# Patient Record
Sex: FEMALE | URBAN - METROPOLITAN AREA
[De-identification: names, ages, dates, MRNs, and addresses within clinical notes are randomized per-mention and may not be internally consistent; named-entity substitution may affect disease eponyms.]

---

## 2019-04-12 LAB
EXTERNAL HIV CONFIRMATION: NORMAL
EXTERNAL HIV SCREEN: NORMAL
HCV AB SER-ACNC: NORMAL

## 2023-01-13 ENCOUNTER — TELEPHONE (OUTPATIENT)
Dept: ADMINISTRATIVE | Facility: OTHER | Age: 33
End: 2023-01-13

## 2023-01-13 NOTE — TELEPHONE ENCOUNTER
----- Message from Altagracia Watkins sent at 1/13/2023 10:20 AM EST -----  Regarding: Care Gap Update  01/13/23 10:20 AM    Hello, our patient attached above has had Hepatitis C completed/performed  Please assist in updating the patient chart by pulling the Care Everywhere (CE) document  The date of service is 04/12/19  Hello, our patient attached above has had HIV completed/performed  Please assist in updating the patient chart by pulling the Care Everywhere (CE) document  The date of service is 04/12/19         Thank you,  Altagracia Watkins   [facility]

## 2023-01-13 NOTE — TELEPHONE ENCOUNTER
----- Message from Gagandeep Cueva sent at 1/13/2023 10:21 AM EST -----  Regarding: Care Gap Update  01/13/23 10:21 AM    Hello, our patient attached above has had Pap Smear (HPV) aka Cervical Cancer Screening completed/performed  Please assist in updating the patient chart by pulling the Care Everywhere (CE) document  The date of service is 05/29/20       Thank you,  Gagandeep Cueva   [facility]

## 2023-01-13 NOTE — TELEPHONE ENCOUNTER
Upon review of the In Basket request we were able to locate, review, and update the patient chart as requested for Hepatitis C , HIV and Pap Smear (HPV) aka Cervical Cancer Screening  Any additional questions or concerns should be emailed to the Practice Liaisons via the appropriate education email address, please do not reply via In Basket      Thank you  Yoselyn Juanite

## 2023-01-17 ENCOUNTER — APPOINTMENT (OUTPATIENT)
Dept: LAB | Facility: CLINIC | Age: 33
End: 2023-01-17

## 2023-01-17 ENCOUNTER — OFFICE VISIT (OUTPATIENT)
Dept: FAMILY MEDICINE CLINIC | Facility: CLINIC | Age: 33
End: 2023-01-17

## 2023-01-17 VITALS
DIASTOLIC BLOOD PRESSURE: 62 MMHG | HEIGHT: 66 IN | BODY MASS INDEX: 26.42 KG/M2 | OXYGEN SATURATION: 99 % | HEART RATE: 73 BPM | WEIGHT: 164.4 LBS | TEMPERATURE: 98.2 F | SYSTOLIC BLOOD PRESSURE: 119 MMHG

## 2023-01-17 DIAGNOSIS — Z13.220 ENCOUNTER FOR SCREENING FOR LIPID DISORDER: ICD-10-CM

## 2023-01-17 DIAGNOSIS — Z86.39 HISTORY OF HYPOTHYROIDISM: Primary | ICD-10-CM

## 2023-01-17 DIAGNOSIS — Z86.39 HISTORY OF HYPOTHYROIDISM: ICD-10-CM

## 2023-01-17 PROBLEM — F51.01 PRIMARY INSOMNIA: Status: ACTIVE | Noted: 2023-01-17

## 2023-01-17 LAB
CHOLEST SERPL-MCNC: 205 MG/DL
HDLC SERPL-MCNC: 107 MG/DL
LDLC SERPL CALC-MCNC: 88 MG/DL (ref 0–100)
NONHDLC SERPL-MCNC: 98 MG/DL
TRIGL SERPL-MCNC: 50 MG/DL
TSH SERPL DL<=0.05 MIU/L-ACNC: 1.75 UIU/ML (ref 0.45–4.5)

## 2023-01-17 RX ORDER — NORGESTIMATE AND ETHINYL ESTRADIOL 0.25-0.035
KIT ORAL
COMMUNITY
Start: 2023-01-05

## 2023-01-17 NOTE — ASSESSMENT & PLAN NOTE
We discussed strategies for maintaining sleep and recommended she not use her smart phone when she wakes up at night  She should also try using a meditation km  Were trying to avoid sleep aids

## 2023-01-17 NOTE — PROGRESS NOTES
Assessment/Plan:    Problem List Items Addressed This Visit    None  Visit Diagnoses     History of hypothyroidism    -  Primary    Relevant Orders    TSH, 3rd generation with Free T4 reflex    Encounter for screening for lipid disorder        Relevant Orders    Lipid panel        BMI Counseling: Body mass index is 26 53 kg/m²  The BMI is above normal  Nutrition recommendations include encouraging healthy choices of fruits and vegetables  Rationale for BMI follow-up plan is due to patient being overweight or obese  Depression Screening and Follow-up Plan: Patient was screened for depression during today's encounter  They screened negative with a PHQ-2 score of 0  She gets to the eye doctor annually, sees her dentist regularly  Her Pap smear is up-to-date  Chief Complaint    Physical Exam; Insomnia         Patient ID: Maryana Patel is a 28 y o  female who returns for a preventive visit  She has a long-term issue with waking up during sleep  She tends to wake-up after 2-3 hours  She normally goes to bed around 10:00 PM and gets up around 6:00 AM  She has two small children  Objective:    /62 (BP Location: Left arm, Patient Position: Sitting, Cuff Size: Large)   Pulse 73   Temp 98 2 °F (36 8 °C)   Ht 5' 6" (1 676 m)   Wt 74 6 kg (164 lb 6 4 oz)   SpO2 99%   BMI 26 53 kg/m²     Wt Readings from Last 3 Encounters:   01/17/23 74 6 kg (164 lb 6 4 oz)         Physical Exam  Vitals reviewed  Constitutional:       General: She is not in acute distress  Appearance: Normal appearance  She is well-developed  She is not ill-appearing  HENT:      Head: Normocephalic and atraumatic  Right Ear: Tympanic membrane and external ear normal       Left Ear: Tympanic membrane and external ear normal       Nose: Nose normal       Mouth/Throat:      Mouth: Mucous membranes are moist       Pharynx: Oropharynx is clear  Eyes:      General: No scleral icterus       Extraocular Movements: Extraocular movements intact  Pupils: Pupils are equal, round, and reactive to light  Neck:      Thyroid: No thyroid mass or thyromegaly  Vascular: No JVD  Cardiovascular:      Rate and Rhythm: Normal rate and regular rhythm  Heart sounds: Normal heart sounds  No murmur heard  No friction rub  No gallop  Pulmonary:      Breath sounds: Normal breath sounds  Abdominal:      General: Bowel sounds are normal  There is no distension  Palpations: Abdomen is soft  There is no mass  Musculoskeletal:         General: No swelling  Skin:     Comments: No evidence of recurrence of basal cell cancer on her left forehead  She has scars on her back from previous excisions  She has multiple nevi which appear benign  Neurological:      Mental Status: She is alert  Comments: Cranial nerves II through XII intact, motor was 5/5 in all major groups     Psychiatric:         Mood and Affect: Mood normal

## 2023-03-11 ENCOUNTER — OFFICE VISIT (OUTPATIENT)
Dept: URGENT CARE | Facility: CLINIC | Age: 33
End: 2023-03-11

## 2023-03-11 VITALS
SYSTOLIC BLOOD PRESSURE: 123 MMHG | WEIGHT: 160 LBS | HEIGHT: 66 IN | RESPIRATION RATE: 18 BRPM | HEART RATE: 94 BPM | BODY MASS INDEX: 25.71 KG/M2 | OXYGEN SATURATION: 98 % | TEMPERATURE: 99.1 F | DIASTOLIC BLOOD PRESSURE: 90 MMHG

## 2023-03-11 DIAGNOSIS — J02.0 STREP PHARYNGITIS: Primary | ICD-10-CM

## 2023-03-11 LAB — S PYO AG THROAT QL: POSITIVE

## 2023-03-11 RX ORDER — AMOXICILLIN 500 MG/1
500 TABLET, FILM COATED ORAL 2 TIMES DAILY
Qty: 14 TABLET | Refills: 0 | Status: SHIPPED | OUTPATIENT
Start: 2023-03-11 | End: 2023-03-18

## 2023-03-11 NOTE — PROGRESS NOTES
[facility]        NAME: Frank Amado is a 35 y o  female  : 1990    MRN: [id number]  DATE: 2023  TIME: 10:33 AM    Assessment and Plan   Strep pharyngitis [J02 0]  1  Strep pharyngitis  POCT rapid strepA    amoxicillin (AMOXIL) 500 MG tablet        Discussed problem with patient  Rapid strep performed today was positive so prescribing amoxicillin for coverage  Vies conservative management by starting with warm salt water gargles as well as ice pops  Should be taking Tylenol and ibuprofen for pain symptoms  Should push fluids and follow-up with PCP if symptoms do not improve or report to the ER if symptoms worsen  Patient Instructions       Follow up with PCP in 3-5 days  Proceed to  ER if symptoms worsen  Chief Complaint     Chief Complaint   Patient presents with   • Sore Throat     Sore throat, trouble swallowing, body aches x3 days  History of Present Illness       Sore Throat   This is a new problem  The current episode started in the past 7 days  The problem has been unchanged  There has been no fever  The pain is at a severity of 5/10  The pain is moderate  Associated symptoms include swollen glands and trouble swallowing  Pertinent negatives include no abdominal pain, congestion, coughing, diarrhea, ear pain, headaches, shortness of breath, stridor or vomiting  She has had no exposure to strep or mono  She has tried NSAIDs for the symptoms  The treatment provided mild relief  Review of Systems   Review of Systems   Constitutional: Positive for appetite change and fever  Negative for chills and fatigue  HENT: Positive for sore throat and trouble swallowing  Negative for congestion, ear pain, postnasal drip, rhinorrhea, sinus pressure and sinus pain  Respiratory: Negative for cough, shortness of breath, wheezing and stridor  Cardiovascular: Negative for chest pain and palpitations     Gastrointestinal: Negative for abdominal pain, constipation, diarrhea, nausea and vomiting  Musculoskeletal: Positive for myalgias  Neurological: Negative for dizziness, syncope, light-headedness and headaches  Current Medications       Current Outpatient Medications:   •  amoxicillin (AMOXIL) 500 MG tablet, Take 1 tablet (500 mg total) by mouth 2 (two) times a day for 7 days, Disp: 14 tablet, Rfl: 0  •  norgestimate-ethinyl estradiol (ORTHO-CYCLEN) 0 25-35 MG-MCG per tablet, , Disp: , Rfl:     Current Allergies     Allergies as of 03/11/2023   • (No Known Allergies)            The following portions of the patient's history were reviewed and updated as appropriate: allergies, current medications, past family history, past medical history, past social history, past surgical history and problem list      Past Medical History:   Diagnosis Date   • Abnormal Pap smear of cervix    • Basal cell carcinoma, face    • 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    • Hypothyroid in pregnancy, antepartum        Past Surgical History:   Procedure Laterality Date   • KNEE ARTHROSCOPY W/ MENISCAL REPAIR     • WISDOM TOOTH EXTRACTION         Family History   Problem Relation Age of Onset   • Diabetic kidney disease Mother    • Parkinsonism Father    • Thyroid disease Maternal Grandmother    • Diabetes Maternal Grandmother          Medications have been verified  Objective   /90   Pulse 94   Temp 99 1 °F (37 3 °C)   Resp 18   Ht 5' 6" (1 676 m)   Wt 72 6 kg (160 lb)   SpO2 98%   BMI 25 82 kg/m²        Physical Exam     Physical Exam  Vitals and nursing note reviewed  Constitutional:       General: She is not in acute distress  Appearance: She is well-developed and normal weight  She is not ill-appearing, toxic-appearing or diaphoretic  HENT:      Head: Normocephalic  Right Ear: Tympanic membrane and ear canal normal  No tenderness  No middle ear effusion  Tympanic membrane is not erythematous        Left Ear: Tympanic membrane and ear canal normal  No tenderness  No middle ear effusion  Tympanic membrane is not erythematous  Nose: No congestion or rhinorrhea  Mouth/Throat:      Mouth: Mucous membranes are moist  No oral lesions  Pharynx: Oropharynx is clear  Uvula midline  Posterior oropharyngeal erythema present  No pharyngeal swelling, oropharyngeal exudate or uvula swelling  Tonsils: No tonsillar exudate or tonsillar abscesses  0 on the right  0 on the left  Eyes:      Extraocular Movements:      Right eye: Normal extraocular motion  Left eye: Normal extraocular motion  Conjunctiva/sclera: Conjunctivae normal       Pupils: Pupils are equal, round, and reactive to light  Neck:      Thyroid: No thyromegaly  Cardiovascular:      Rate and Rhythm: Normal rate and regular rhythm  Heart sounds: Normal heart sounds  No murmur heard  No friction rub  No gallop  Pulmonary:      Effort: Pulmonary effort is normal  No respiratory distress  Breath sounds: Normal breath sounds  No stridor  No wheezing, rhonchi or rales  Chest:      Chest wall: No tenderness  Musculoskeletal:      Cervical back: Normal range of motion and neck supple  Lymphadenopathy:      Cervical: Cervical adenopathy present  Skin:     General: Skin is warm and dry  Capillary Refill: Capillary refill takes less than 2 seconds  Coloration: Skin is not pale  Findings: No erythema or rash  Neurological:      General: No focal deficit present  Mental Status: She is alert and oriented to person, place, and time     Psychiatric:         Mood and Affect: Mood normal          Behavior: Behavior normal

## 2023-08-07 ENCOUNTER — OFFICE VISIT (OUTPATIENT)
Dept: FAMILY MEDICINE CLINIC | Facility: CLINIC | Age: 33
End: 2023-08-07
Payer: COMMERCIAL

## 2023-08-07 VITALS
WEIGHT: 166.6 LBS | TEMPERATURE: 97.8 F | BODY MASS INDEX: 26.78 KG/M2 | SYSTOLIC BLOOD PRESSURE: 122 MMHG | OXYGEN SATURATION: 100 % | HEART RATE: 83 BPM | HEIGHT: 66 IN | DIASTOLIC BLOOD PRESSURE: 82 MMHG

## 2023-08-07 DIAGNOSIS — E66.3 OVERWEIGHT WITH BODY MASS INDEX (BMI) OF 26 TO 26.9 IN ADULT: Primary | ICD-10-CM

## 2023-08-07 PROCEDURE — 99213 OFFICE O/P EST LOW 20 MIN: CPT | Performed by: INTERNAL MEDICINE

## 2023-08-07 NOTE — ASSESSMENT & PLAN NOTE
Encouraged her to snack on whole foods in the evening such as vegetables. Tracking may also help with weight loss. Were going to look into weight loss medications for her. We did review GLP-1 agonists and how they work and how to reduce the risk of side effects.

## 2023-08-07 NOTE — PROGRESS NOTES
Assessment/Plan:    Problem List Items Addressed This Visit        Other    Overweight with body mass index (BMI) of 26 to 26.9 in adult - Primary     Encouraged her to snack on whole foods in the evening such as vegetables. Tracking may also help with weight loss. Were going to look into weight loss medications for her. We did review GLP-1 agonists and how they work and how to reduce the risk of side effects. BMI Counseling: Body mass index is 26.89 kg/m². The BMI is above normal. Nutrition recommendations include encouraging healthy choices of fruits and vegetables. Pharmacotherapy was ordered to help aid in weight loss. Rationale for BMI follow-up plan is due to patient being overweight or obese. Depression Screening and Follow-up Plan: Patient was screened for depression during today's encounter. They screened negative with a PHQ-2 score of 0. Chief Complaint    Follow-up; Weight Loss; Care Gap Request         Patient ID: Maryjo Garcia is a 35 y.o. female who returns for routine follow up. She has been struggling with losing weight. She is currently doing 30 minutes on the [employer] bicycle 3 days a week. She also walks 2-3 days a week for 20-30 minutes. She has a varied diet. She hasn't tracked her nutrition. She mostly drinks water, hot tea, or iced coffee. She does put sugar in the iced coffee but she doesn't drink it every day. She does eat supper around 5:00 and tends to snack on bologna and cheese in the evenings. Objective:    /82 (BP Location: Left arm, Patient Position: Sitting, Cuff Size: Adult)   Pulse 83   Temp 97.8 °F (36.6 °C)   Ht 5' 6" (1.676 m)   Wt 75.6 kg (166 lb 9.6 oz)   SpO2 100%   BMI 26.89 kg/m²     Wt Readings from Last 3 Encounters:   08/07/23 75.6 kg (166 lb 9.6 oz)   03/11/23 72.6 kg (160 lb)   01/17/23 74.6 kg (164 lb 6.4 oz)         Physical Exam    General:  Well-developed, well-nourished, in no acute distress. Exam otherwise deferred today.

## 2023-08-08 ENCOUNTER — TELEPHONE (OUTPATIENT)
Dept: INTERNAL MEDICINE CLINIC | Facility: CLINIC | Age: 33
End: 2023-08-08

## 2023-08-08 NOTE — TELEPHONE ENCOUNTER
Pt called asking to speak with [facility] regarding insurance not covering pt's william.   [phone]

## 2023-08-10 ENCOUNTER — PATIENT MESSAGE (OUTPATIENT)
Dept: FAMILY MEDICINE CLINIC | Facility: CLINIC | Age: 33
End: 2023-08-10

## 2023-08-10 DIAGNOSIS — E66.3 OVERWEIGHT WITH BODY MASS INDEX (BMI) OF 26 TO 26.9 IN ADULT: Primary | ICD-10-CM

## 2023-08-10 RX ORDER — PHENTERMINE HYDROCHLORIDE 15 MG/1
15 CAPSULE ORAL EVERY MORNING
Qty: 30 CAPSULE | Refills: 0 | Status: SHIPPED | OUTPATIENT
Start: 2023-08-10

## 2023-08-10 NOTE — TELEPHONE ENCOUNTER
Fili Toney 8/10/2023 2:15 PM EDT      ----- Message -----  From: Tatiana Granda  Sent: 8/10/2023 2:09 PM EDT  To: [facility]  Subject: Weight Loss Options     I’m starting a new message here just between us. I’m looking for options. Swetha Fu been on the phone with my insurance company and pharmacies the last three days.  What do you suggest?

## 2023-09-04 ENCOUNTER — TELEPHONE (OUTPATIENT)
Dept: OTHER | Facility: OTHER | Age: 33
End: 2023-09-04

## 2023-09-05 NOTE — TELEPHONE ENCOUNTER
Patient is calling regarding cancelling an appointment.     Date/Time:9-5-2023 @ 09:15 am    Patient was rescheduled: YES [] NO [x]    Patient requesting call back to reschedule: YES [x] NO []

## 2023-09-12 ENCOUNTER — OFFICE VISIT (OUTPATIENT)
Dept: FAMILY MEDICINE CLINIC | Facility: CLINIC | Age: 33
End: 2023-09-12
Payer: COMMERCIAL

## 2023-09-12 VITALS
WEIGHT: 164.4 LBS | BODY MASS INDEX: 26.42 KG/M2 | HEIGHT: 66 IN | SYSTOLIC BLOOD PRESSURE: 112 MMHG | HEART RATE: 80 BPM | OXYGEN SATURATION: 100 % | TEMPERATURE: 97.6 F | DIASTOLIC BLOOD PRESSURE: 65 MMHG

## 2023-09-12 DIAGNOSIS — E66.3 OVERWEIGHT WITH BODY MASS INDEX (BMI) OF 26 TO 26.9 IN ADULT: ICD-10-CM

## 2023-09-12 PROCEDURE — 99213 OFFICE O/P EST LOW 20 MIN: CPT | Performed by: INTERNAL MEDICINE

## 2023-09-12 RX ORDER — PHENTERMINE HYDROCHLORIDE 37.5 MG/1
37.5 CAPSULE ORAL EVERY MORNING
Qty: 30 CAPSULE | Refills: 0 | Status: SHIPPED | OUTPATIENT
Start: 2023-09-12

## 2023-09-12 NOTE — ASSESSMENT & PLAN NOTE
Going to switch her to the 37.5 mg dose of phentermine. She can take that in the afternoon. She should assure that when she works out on her palatine she is in zone 2 for most of that workout 2 days a week and that she try getting into zone 3 on her third workout day. She should also work in some light weight training and squats.

## 2023-09-12 NOTE — PROGRESS NOTES
Assessment/Plan:    Problem List Items Addressed This Visit        Other    Overweight with body mass index (BMI) of 26 to 26.9 in adult     Going to switch her to the 37.5 mg dose of phentermine. She can take that in the afternoon. She should assure that when she works out on her palatine she is in zone 2 for most of that workout 2 days a week and that she try getting into zone 3 on her third workout day. She should also work in some light weight training and squats. Relevant Medications    phentermine 37.5 MG capsule       Chief Complaint    Follow-up         Patient ID: Abril Gutierrez is a 35 y.o. female who returns for follow-up after starting phentermine. She was taking 15 mg in the morning but didn't feel this was really suppressing her appetite in the afternoon so she switched it to the afternoons. She felt this helped a little bit but hasn't had much success with weight loss. She is only down 2 pounds from August.  She exercises on her Peloton 3 times per week. Objective:    /65 (BP Location: Left arm, Patient Position: Sitting, Cuff Size: Adult)   Pulse 80   Temp 97.6 °F (36.4 °C)   Ht 5' 6" (1.676 m)   Wt 74.6 kg (164 lb 6.4 oz)   SpO2 100%   BMI 26.53 kg/m²     Wt Readings from Last 3 Encounters:   09/12/23 74.6 kg (164 lb 6.4 oz)   08/07/23 75.6 kg (166 lb 9.6 oz)   03/11/23 72.6 kg (160 lb)         Physical Exam    General:  Well-developed, well-nourished, in no acute distress.   Exam otherwise deferred today

## 2023-10-17 ENCOUNTER — PATIENT MESSAGE (OUTPATIENT)
Dept: FAMILY MEDICINE CLINIC | Facility: CLINIC | Age: 33
End: 2023-10-17

## 2023-10-17 DIAGNOSIS — E66.3 OVERWEIGHT WITH BODY MASS INDEX (BMI) OF 26 TO 26.9 IN ADULT: ICD-10-CM

## 2023-10-18 RX ORDER — PHENTERMINE HYDROCHLORIDE 37.5 MG/1
37.5 CAPSULE ORAL EVERY MORNING
Qty: 30 CAPSULE | Refills: 0 | Status: SHIPPED | OUTPATIENT
Start: 2023-10-18

## 2023-11-20 ENCOUNTER — OFFICE VISIT (OUTPATIENT)
Dept: FAMILY MEDICINE CLINIC | Facility: CLINIC | Age: 33
End: 2023-11-20
Payer: COMMERCIAL

## 2023-11-20 VITALS
SYSTOLIC BLOOD PRESSURE: 110 MMHG | HEIGHT: 66 IN | HEART RATE: 80 BPM | DIASTOLIC BLOOD PRESSURE: 62 MMHG | BODY MASS INDEX: 24.33 KG/M2 | OXYGEN SATURATION: 99 % | WEIGHT: 151.4 LBS

## 2023-11-20 DIAGNOSIS — Z78.9 ADVISED ABOUT MANAGEMENT OF WEIGHT: Primary | ICD-10-CM

## 2023-11-20 PROBLEM — Z76.89 ENCOUNTER FOR WEIGHT MANAGEMENT: Status: ACTIVE | Noted: 2023-08-07

## 2023-11-20 PROCEDURE — 99213 OFFICE O/P EST LOW 20 MIN: CPT | Performed by: INTERNAL MEDICINE

## 2023-11-20 NOTE — PROGRESS NOTES
Assessment/Plan:    Problem List Items Addressed This Visit        Other    Advised about management of weight - Primary     Weight management  Her body mass index is 24.4 kg/m2. She will continue to take phentermine. I advised her to do regular daily exercise because it is important in weight maintenance. She will try to continue to do exercise and get her as many days as possible. She will think about long term food choices as a transition strategy. She is scheduled for her physical in 01/2024. Chief Complaint    Follow-up; Care Gap Request         Patient ID: Baldwin Goldmann is a 35 y.o. female who returns for routine follow up. The patient presents today for follow up on her weight. She has been taking phentermine 37.5 mg with benefits. Her weight decreased by thirteen pounds in two months. The phentermine has curbed her appetite and cravings. She took it early in the morning and then she found that she was getting hungry later in the evening, so she has been taking it at 12 or 1 o'clock and she thinks that is working better. She is no longer snacking in the evening as she was before. She feels more energized. She notes occasional insomnia; however, she was having issues with sleeping even before taking the medication. Her body mass index has reduced to normal range of 24.4 kg/m2 from 26 kg/m2. Her weight goal was 145 pounds, and she would like to maintain it the best way possible. The patient has been doing zone 2 Peloton exercise 2 days a week and 5-pound weight 2 days a week and she does 20-30 minutes. The patient is not a breakfast eater. She eats small portions. She loves salad, fruits, and vegetables and she incorporate it as much as she can. She has nighttime cravings and eats chips and sauce at night.        Objective:    /62 (BP Location: Left arm, Patient Position: Sitting, Cuff Size: Standard)   Pulse 80   Ht 5' 6" (1.676 m)   Wt 68.7 kg (151 lb 6.4 oz)   SpO2 99%   BMI 24.44 kg/m² Wt Readings from Last 3 Encounters:   11/20/23 68.7 kg (151 lb 6.4 oz)   09/12/23 74.6 kg (164 lb 6.4 oz)   08/07/23 75.6 kg (166 lb 9.6 oz)         Physical Exam    General:  Well-developed, well-nourished, in no acute distress. Exam otherwise deferred today    Transcribed for Juan Pablo Casiano MD, by Clifton Maya on 11/20/23 at 2:51 PM. Powered by [employer].

## 2024-02-12 ENCOUNTER — OFFICE VISIT (OUTPATIENT)
Dept: FAMILY MEDICINE CLINIC | Facility: CLINIC | Age: 34
End: 2024-02-12
Payer: COMMERCIAL

## 2024-02-12 VITALS
HEART RATE: 73 BPM | DIASTOLIC BLOOD PRESSURE: 81 MMHG | BODY MASS INDEX: 24.78 KG/M2 | HEIGHT: 66 IN | OXYGEN SATURATION: 99 % | SYSTOLIC BLOOD PRESSURE: 132 MMHG | WEIGHT: 154.2 LBS

## 2024-02-12 DIAGNOSIS — Z78.9 ADVISED ABOUT MANAGEMENT OF WEIGHT: Primary | ICD-10-CM

## 2024-02-12 PROCEDURE — 99213 OFFICE O/P EST LOW 20 MIN: CPT | Performed by: INTERNAL MEDICINE

## 2024-02-12 PROCEDURE — 99395 PREV VISIT EST AGE 18-39: CPT | Performed by: INTERNAL MEDICINE

## 2024-02-12 RX ORDER — TOPIRAMATE 25 MG/1
25 TABLET ORAL 2 TIMES DAILY
Qty: 60 TABLET | Refills: 5 | Status: SHIPPED | OUTPATIENT
Start: 2024-02-12

## 2024-02-12 NOTE — PROGRESS NOTES
Name: Maribel Caldwell      : 1990      MRN: [id number]  Encounter Provider: Mahamed Dunn MD  Encounter Date: 2024   Encounter department: [facility]    Assessment & Plan     Assessment & Plan  1. Weight management.  Her body mass index today is 24.89, which is considered normal. She is not a candidate for bariatric surgery. I will add topiramate 25 mg once a day for 5 to 7 days. If she tolerates it, she can go up to 25 mg twice a day for a month. She will send me a [email or web address] message to let me know how she is doing. She will continue the phentermine and topiramate at 25 mg twice a day. If she gets back on track and is dropping weight again, we do not need to do anything. If there is no change, we will probably go up to 50 mg twice a day and titrate a little higher.    Follow-up  The patient will follow up in 2 months.         No orders of the defined types were placed in this encounter.      Subjective      History of Present Illness  The patient is a 33-year-old female who presents for a physical exam and to follow up on her weight.    Her weight was 152 at home, but she was not dressed and did not have shoes on. Her weight has been consistent in the 150 to 152 range for the last few weeks. She has plateaued. She is still on phentermine 37.5 mg once a day. It does curb her appetite where she is not as hungry, but the holidays and a lot going on in the last few weeks played a part in her weight loss. She exercises at least 2 days a week. She uses the Peloton and weight training at least 2 times a week. Wegovy was not covered by her insurance. She is not prediabetic. Her goal weight is 145 pounds.   She has 2 daughters. She works from home.   Her mother is prediabetic and is on Ozempic. Her aunt is on Wegovy.   She has received 2 COVID-19 vaccines, but has not received any boosters.She sees her regular GYN at [facility] next week. She sees a dentist a couple of times a year.  "She sees an eye doctor. She wears contacts during the day and glasses at night. She sees a dermatologist every 6 months. She had basal cell carcinoma a few years ago.  Review of Systems      Objective     /81 (BP Location: Left arm, Patient Position: Sitting, Cuff Size: Large)   Pulse 73   Ht 5' 6\" (1.676 m)   Wt 69.9 kg (154 lb 3.2 oz)   SpO2 99%   BMI 24.89 kg/m²       Wt Readings from Last 3 Encounters:   02/12/24 69.9 kg (154 lb 3.2 oz)   11/20/23 68.7 kg (151 lb 6.4 oz)   09/12/23 74.6 kg (164 lb 6.4 oz)      Physical Exam  She has a few moles.    Vital Signs  Weight is 154.  Physical Exam  Constitutional:       General: She is not in acute distress.     Appearance: Normal appearance. She is well-developed. She is not ill-appearing.   HENT:      Right Ear: Tympanic membrane normal. There is no impacted cerumen.      Left Ear: Tympanic membrane normal. There is no impacted cerumen.      Mouth/Throat:      Mouth: Mucous membranes are moist.      Pharynx: Oropharynx is clear.   Eyes:      General: No scleral icterus.  Neck:      Thyroid: No thyromegaly.      Vascular: No JVD.   Cardiovascular:      Rate and Rhythm: Normal rate and regular rhythm.      Heart sounds: Normal heart sounds. No murmur heard.     No friction rub. No gallop.   Pulmonary:      Effort: Pulmonary effort is normal.      Breath sounds: Normal breath sounds. No wheezing or rales.   Abdominal:      General: Bowel sounds are normal. There is no distension.      Palpations: Abdomen is soft. There is no mass.      Tenderness: There is no abdominal tenderness.   Musculoskeletal:         General: No swelling.   Lymphadenopathy:      Cervical: No cervical adenopathy.   Skin:     Comments: Scattered nevi on back, none are suspicious.   Neurological:      Mental Status: She is alert.   Psychiatric:         Mood and Affect: Mood normal.           "

## 2024-02-21 DIAGNOSIS — E66.3 OVERWEIGHT WITH BODY MASS INDEX (BMI) OF 26 TO 26.9 IN ADULT: ICD-10-CM

## 2024-02-21 RX ORDER — PHENTERMINE HYDROCHLORIDE 37.5 MG/1
37.5 CAPSULE ORAL EVERY MORNING
Qty: 30 CAPSULE | Refills: 0 | Status: SHIPPED | OUTPATIENT
Start: 2024-02-21

## 2024-02-21 NOTE — TELEPHONE ENCOUNTER
PDMP reviewed. No red flags identified; safe to proceed with prescription.    PDMP Review       Value Time User    PDMP Reviewed  Yes 2/21/2024  4:05 PM Mahamed Dunn MD

## 2024-04-15 DIAGNOSIS — E66.3 OVERWEIGHT WITH BODY MASS INDEX (BMI) OF 26 TO 26.9 IN ADULT: ICD-10-CM

## 2024-04-15 RX ORDER — PHENTERMINE HYDROCHLORIDE 37.5 MG/1
37.5 CAPSULE ORAL EVERY MORNING
Qty: 30 CAPSULE | Refills: 0 | Status: SHIPPED | OUTPATIENT
Start: 2024-04-15

## 2024-04-15 NOTE — TELEPHONE ENCOUNTER
PDMP reviewed. No red flags identified; safe to proceed with prescription.    PDMP Review       Value Time User    PDMP Reviewed  Yes 4/15/2024  4:37 PM Mahamed Dunn MD

## 2024-04-30 ENCOUNTER — OFFICE VISIT (OUTPATIENT)
Dept: FAMILY MEDICINE CLINIC | Facility: CLINIC | Age: 34
End: 2024-04-30
Payer: COMMERCIAL

## 2024-04-30 VITALS
SYSTOLIC BLOOD PRESSURE: 116 MMHG | WEIGHT: 147.2 LBS | DIASTOLIC BLOOD PRESSURE: 62 MMHG | HEART RATE: 72 BPM | BODY MASS INDEX: 23.66 KG/M2 | OXYGEN SATURATION: 98 % | HEIGHT: 66 IN

## 2024-04-30 DIAGNOSIS — Z78.9 ADVISED ABOUT MANAGEMENT OF WEIGHT: Primary | ICD-10-CM

## 2024-04-30 PROCEDURE — 99213 OFFICE O/P EST LOW 20 MIN: CPT | Performed by: INTERNAL MEDICINE

## 2024-04-30 NOTE — PROGRESS NOTES
"Name: Maribel Caldwell      : 1990      MRN: [id number]  Encounter Provider: Mahamed Dunn MD  Encounter Date: 2024   Encounter department: [facility]    Assessment & Plan     Assessment & Plan  1. Weight management.  The patient has experienced a weight loss of 19 pounds since 2023. The patient was counseled on the importance of maintaining a balanced diet and engaging in regular exercise. The continuation of phentermine was recommended.    Follow-up  The patient is scheduled for a follow-up visit in 3 months, or earlier if necessary.         Subjective      History of Present Illness  The patient presents for evaluation of weight management.    The patient has discontinued the use of topiramate, citing adverse effects including cognitive clouding and concentration difficulties as a side effect. She is currently on a daily regimen of phentermine 37.5 mg, which has resulted in a weight loss of a few pounds. She acknowledges the efficacy of phentermine in managing her weight. Her exercise routine has been inconsistent.  Review of Systems      Objective     /62 (BP Location: Left arm, Patient Position: Sitting, Cuff Size: Standard)   Pulse 72   Ht 5' 6\" (1.676 m)   Wt 66.8 kg (147 lb 3.2 oz)   SpO2 98%   BMI 23.76 kg/m²     Wt Readings from Last 3 Encounters:   24 66.8 kg (147 lb 3.2 oz)   24 69.9 kg (154 lb 3.2 oz)   23 68.7 kg (151 lb 6.4 oz)      Physical Exam    Physical Exam  Constitutional:       General: She is not in acute distress.     Appearance: Normal appearance. She is not ill-appearing.   Neurological:      Mental Status: She is alert.     Exam otherwise deferred today      "

## 2024-06-17 DIAGNOSIS — E66.3 OVERWEIGHT WITH BODY MASS INDEX (BMI) OF 26 TO 26.9 IN ADULT: ICD-10-CM

## 2024-06-17 RX ORDER — PHENTERMINE HYDROCHLORIDE 37.5 MG/1
37.5 CAPSULE ORAL EVERY MORNING
Qty: 30 CAPSULE | Refills: 0 | Status: SHIPPED | OUTPATIENT
Start: 2024-06-17

## 2024-06-17 NOTE — TELEPHONE ENCOUNTER
PDMP reviewed. No red flags identified; safe to proceed with prescription.    PDMP Review       Value Time User    PDMP Reviewed  Yes 6/17/2024 10:11 AM Mahamed Dunn MD

## 2024-07-29 DIAGNOSIS — E66.3 OVERWEIGHT WITH BODY MASS INDEX (BMI) OF 26 TO 26.9 IN ADULT: ICD-10-CM

## 2024-07-30 RX ORDER — PHENTERMINE HYDROCHLORIDE 37.5 MG/1
37.5 CAPSULE ORAL EVERY MORNING
Qty: 30 CAPSULE | Refills: 0 | Status: SHIPPED | OUTPATIENT
Start: 2024-07-30

## 2024-07-30 NOTE — TELEPHONE ENCOUNTER
PDMP reviewed. No red flags identified; safe to proceed with prescription.    PDMP Review       Value Time User    PDMP Reviewed  Yes 7/30/2024  4:51 PM Mahamed Dunn MD

## 2024-08-06 ENCOUNTER — OFFICE VISIT (OUTPATIENT)
Dept: FAMILY MEDICINE CLINIC | Facility: CLINIC | Age: 34
End: 2024-08-06
Payer: COMMERCIAL

## 2024-08-06 VITALS
WEIGHT: 144.2 LBS | DIASTOLIC BLOOD PRESSURE: 58 MMHG | BODY MASS INDEX: 23.18 KG/M2 | HEIGHT: 66 IN | HEART RATE: 74 BPM | OXYGEN SATURATION: 98 % | SYSTOLIC BLOOD PRESSURE: 112 MMHG

## 2024-08-06 DIAGNOSIS — Z78.9 ADVISED ABOUT MANAGEMENT OF WEIGHT: Primary | ICD-10-CM

## 2024-08-06 PROCEDURE — 99213 OFFICE O/P EST LOW 20 MIN: CPT | Performed by: INTERNAL MEDICINE

## 2024-08-06 NOTE — ASSESSMENT & PLAN NOTE
She's had great success on the phentermine.  We discussed strategies for maintaining her current weight.  She is going to try to figure out what she can eat for breakfast lunch and supper on a consistent basis and then may actually try taking the phentermine 3 days/week.  I am fine with her taking it for the long-term for now.

## 2024-08-06 NOTE — PROGRESS NOTES
"Ambulatory Visit  Name: Maribel Caldwell      : 1990      MRN: [id number]  Encounter Provider: Mahamed Dunn MD  Encounter Date: 2024   Encounter department: [facility]    Assessment & Plan  Advised about management of weight  She's had great success on the phentermine.  We discussed strategies for maintaining her current weight.  She is going to try to figure out what she can eat for breakfast lunch and supper on a consistent basis and then may actually try taking the phentermine 3 days/week.  I am fine with her taking it for the long-term for now.            History of Present Illness       History of Present Illness  The patient is a 34-year-old female who presents for evaluation of weight management.    She is currently on phentermine for weight management, which has been effective in suppressing her appetite. She reports a weight loss of 10 pounds since 2024 and 7 pounds since 2024, but her weight has plateaued over the past 2 to 3 weeks. She exercises approximately three times a week. Her weight gain began three years ago when she began working from home. She attributes her weight gain to portion control.        Objective     /58 (BP Location: Right arm, Patient Position: Sitting, Cuff Size: Standard)   Pulse 74   Ht 5' 6\" (1.676 m)   Wt 65.4 kg (144 lb 3.2 oz)   SpO2 98%   BMI 23.27 kg/m²       Wt Readings from Last 3 Encounters:   24 65.4 kg (144 lb 3.2 oz)   24 66.8 kg (147 lb 3.2 oz)   24 69.9 kg (154 lb 3.2 oz)      Physical Exam    Physical Exam  Constitutional:       General: She is not in acute distress.     Appearance: Normal appearance. She is not ill-appearing.   Neurological:      Mental Status: She is alert.       Administrative Statements       "

## 2024-08-13 ENCOUNTER — OFFICE VISIT (OUTPATIENT)
Dept: URGENT CARE | Facility: CLINIC | Age: 34
End: 2024-08-13
Payer: COMMERCIAL

## 2024-08-13 VITALS
BODY MASS INDEX: 23.14 KG/M2 | TEMPERATURE: 98.1 F | HEART RATE: 102 BPM | OXYGEN SATURATION: 98 % | DIASTOLIC BLOOD PRESSURE: 60 MMHG | RESPIRATION RATE: 16 BRPM | WEIGHT: 144 LBS | HEIGHT: 66 IN | SYSTOLIC BLOOD PRESSURE: 122 MMHG

## 2024-08-13 DIAGNOSIS — J06.9 ACUTE URI: Primary | ICD-10-CM

## 2024-08-13 LAB — S PYO AG THROAT QL: NEGATIVE

## 2024-08-13 PROCEDURE — 99213 OFFICE O/P EST LOW 20 MIN: CPT

## 2024-08-13 PROCEDURE — 87880 STREP A ASSAY W/OPTIC: CPT

## 2024-08-13 RX ORDER — FLUTICASONE PROPIONATE 50 MCG
2 SPRAY, SUSPENSION (ML) NASAL DAILY
Qty: 11.1 ML | Refills: 0 | Status: SHIPPED | OUTPATIENT
Start: 2024-08-13

## 2024-08-13 NOTE — PROGRESS NOTES
[facility]        NAME: Maribel Caldwell is a 34 y.o. female  : 1990    MRN: [id number]  DATE: 2024  TIME: 10:36 AM    Assessment and Plan   Acute URI [J06.9]  1. Acute URI  POCT rapid strepA    fluticasone (FLONASE) 50 mcg/act nasal spray            Patient Instructions       Follow up with PCP in 3-5 days.  Proceed to  ER if symptoms worsen.    If tests are performed, our office will contact you with results only if changes need to made to the care plan discussed with you at the visit. You can review your full results on [facility].    Chief Complaint     Chief Complaint   Patient presents with   • Sore Throat     Sore throat x 2day. Also has a stuffy nose         History of Present Illness       Sore throat x 2day. Also has a stuffy nose.  Reports she had more nasal congestion, runny nose, fatigue which seems to be improving but her main complaints are sore throat and mild cough which is worse at night and does improve throughout the day.  Reports similar pattern normal sore throat.  Using over-the-counter Mucinex DM, ibuprofen with moderate relief.  Denies any shortness of breath, chest pain, abdominal complaints    Sore Throat   Associated symptoms include congestion and coughing. Pertinent negatives include no abdominal pain, diarrhea, ear pain, headaches, shortness of breath, stridor or vomiting.       Review of Systems   Review of Systems   Constitutional:  Positive for fatigue. Negative for appetite change, chills and fever.   HENT:  Positive for congestion, postnasal drip, rhinorrhea and sore throat. Negative for ear pain, sinus pressure and sinus pain.    Respiratory:  Positive for cough. Negative for shortness of breath, wheezing and stridor.    Cardiovascular:  Negative for chest pain and palpitations.   Gastrointestinal:  Negative for abdominal pain, constipation, diarrhea, nausea and vomiting.   Musculoskeletal:  Negative for myalgias.   Neurological:  Negative  "for dizziness, syncope, light-headedness and headaches.         Current Medications       Current Outpatient Medications:   •  fluticasone (FLONASE) 50 mcg/act nasal spray, 2 sprays into each nostril daily, Disp: 11.1 mL, Rfl: 0  •  phentermine 37.5 MG capsule, Take 1 capsule (37.5 mg total) by mouth every morning, Disp: 30 capsule, Rfl: 0    Current Allergies     Allergies as of 08/13/2024   • (No Known Allergies)            The following portions of the patient's history were reviewed and updated as appropriate: allergies, current medications, past family history, past medical history, past social history, past surgical history and problem list.     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        Objective   /60   Pulse 102   Temp 98.1 °F (36.7 °C)   Resp 16   Ht 5' 6\" (1.676 m)   Wt 65.3 kg (144 lb)   LMP 07/20/2024   SpO2 98%   BMI 23.24 kg/m²        Physical Exam     Physical Exam  Vitals and nursing note reviewed.   Constitutional:       General: She is not in acute distress.     Appearance: She is well-developed and normal weight. She is not ill-appearing, toxic-appearing or diaphoretic.   HENT:      Head: Normocephalic.      Right Ear: Tympanic membrane and ear canal normal. No tenderness. No middle ear effusion. Tympanic membrane is not erythematous.      Left Ear: Tympanic membrane and ear canal normal. No tenderness.  No middle ear effusion. Tympanic membrane is not " erythematous.      Nose: Congestion and rhinorrhea present.      Mouth/Throat:      Mouth: Mucous membranes are moist. No oral lesions.      Pharynx: Oropharynx is clear. Uvula midline. Posterior oropharyngeal erythema present. No pharyngeal swelling, oropharyngeal exudate or uvula swelling.      Tonsils: No tonsillar exudate or tonsillar abscesses. 1+ on the right. 1+ on the left.   Eyes:      Extraocular Movements:      Right eye: Normal extraocular motion.      Left eye: Normal extraocular motion.      Conjunctiva/sclera: Conjunctivae normal.      Pupils: Pupils are equal, round, and reactive to light.   Neck:      Thyroid: No thyromegaly.   Cardiovascular:      Rate and Rhythm: Normal rate and regular rhythm.      Heart sounds: Normal heart sounds. No murmur heard.     No friction rub. No gallop.   Pulmonary:      Effort: Pulmonary effort is normal. No respiratory distress.      Breath sounds: Normal breath sounds. No stridor. No wheezing, rhonchi or rales.   Chest:      Chest wall: No tenderness.   Musculoskeletal:      Cervical back: Normal range of motion and neck supple.   Lymphadenopathy:      Cervical: No cervical adenopathy.   Neurological:      Mental Status: She is alert.

## 2024-10-01 DIAGNOSIS — E66.3 OVERWEIGHT WITH BODY MASS INDEX (BMI) OF 26 TO 26.9 IN ADULT: ICD-10-CM

## 2024-10-02 RX ORDER — PHENTERMINE HYDROCHLORIDE 37.5 MG/1
37.5 CAPSULE ORAL EVERY MORNING
Qty: 30 CAPSULE | Refills: 0 | Status: SHIPPED | OUTPATIENT
Start: 2024-10-02

## 2024-10-02 NOTE — TELEPHONE ENCOUNTER
PDMP reviewed. No red flags identified; safe to proceed with prescription.    PDMP Review       Value Time User    PDMP Reviewed  Yes 10/2/2024  8:23 AM Mahamed Dunn MD

## 2024-12-05 ENCOUNTER — OFFICE VISIT (OUTPATIENT)
Dept: URGENT CARE | Facility: CLINIC | Age: 34
End: 2024-12-05
Payer: COMMERCIAL

## 2024-12-05 ENCOUNTER — APPOINTMENT (OUTPATIENT)
Dept: RADIOLOGY | Facility: CLINIC | Age: 34
End: 2024-12-05
Payer: COMMERCIAL

## 2024-12-05 VITALS
WEIGHT: 140 LBS | OXYGEN SATURATION: 98 % | TEMPERATURE: 97.9 F | DIASTOLIC BLOOD PRESSURE: 78 MMHG | SYSTOLIC BLOOD PRESSURE: 118 MMHG | HEIGHT: 66 IN | BODY MASS INDEX: 22.5 KG/M2 | RESPIRATION RATE: 18 BRPM | HEART RATE: 106 BPM

## 2024-12-05 DIAGNOSIS — J40 BRONCHITIS: Primary | ICD-10-CM

## 2024-12-05 DIAGNOSIS — R05.1 ACUTE COUGH: ICD-10-CM

## 2024-12-05 PROCEDURE — 99213 OFFICE O/P EST LOW 20 MIN: CPT

## 2024-12-05 PROCEDURE — 71046 X-RAY EXAM CHEST 2 VIEWS: CPT

## 2024-12-05 RX ORDER — METHYLPREDNISOLONE 4 MG/1
TABLET ORAL
Qty: 1 EACH | Refills: 0 | Status: SHIPPED | OUTPATIENT
Start: 2024-12-05

## 2024-12-05 NOTE — PROGRESS NOTES
[facility]        NAME: Maribel Caldwell is a 34 y.o. female  : 1990    MRN: [id number]  DATE: 2024  TIME: 11:05 AM    Assessment and Plan   Bronchitis [J40]  1. Bronchitis  XR chest pa and lateral    methylPREDNISolone 4 MG tablet therapy pack        Discussed problem with patient.  Chest x-ray revealed no acute abnormalities but awaiting official radiology interpretation.  Suspicious for bronchitis and will prescribe steroid Dosepak and recommended to continue Mucinex DM.  Has been taking once per day and recommended taking twice per day as instructed by box.  Discussed follow-up precautions    Patient Instructions       Follow up with PCP in 3-5 days.  Proceed to  ER if symptoms worsen.    If tests are performed, our office will contact you with results only if changes need to made to the care plan discussed with you at the visit. You can review your full results on [facility].    Chief Complaint     Chief Complaint   Patient presents with   • Cold Like Symptoms     Chest cold with cough x 3 weeks         History of Present Illness       34-year-old female presents with coughing complaints for the last 3 weeks.  Started out with nasal congestion, runny nose, minor sore throat and cough which other symptoms have dissipated but still reports persistent cough complaints and chest congestion.  Using Mucinex DM which is helpful during the day but then wears off at night.  Denies any fevers or chills and is eating and drinking normally denies any pleuritic chest pain or shortness of breath      Review of Systems   Review of Systems   Constitutional:  Negative for appetite change, chills, fatigue and fever.   HENT:  Negative for congestion, ear pain, postnasal drip, rhinorrhea, sinus pressure, sinus pain and sore throat.    Respiratory:  Positive for cough and chest tightness. Negative for shortness of breath, wheezing and stridor.    Cardiovascular:  Negative for chest pain and  "palpitations.         Current Medications       Current Outpatient Medications:   •  methylPREDNISolone 4 MG tablet therapy pack, Use as directed on package, Disp: 1 each, Rfl: 0  •  fluticasone (FLONASE) 50 mcg/act nasal spray, 2 sprays into each nostril daily (Patient not taking: Reported on 12/5/2024), Disp: 11.1 mL, Rfl: 0  •  phentermine 37.5 MG capsule, Take 1 capsule (37.5 mg total) by mouth every morning (Patient not taking: Reported on 12/5/2024), Disp: 30 capsule, Rfl: 0    Current Allergies     Allergies as of 12/05/2024   • (No Known Allergies)            The following portions of the patient's history were reviewed and updated as appropriate: allergies, current medications, past family history, past medical history, past social history, past surgical history and problem list.     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        Objective   /78   Pulse (!) 106   Temp 97.9 °F (36.6 °C)   Resp 18   Ht 5' 6\" (1.676 m)   Wt 63.5 kg (140 lb)   LMP 11/19/2024   SpO2 98%   BMI 22.60 kg/m²        Physical Exam     Physical Exam  Vitals reviewed.   Constitutional:       General: She is not in acute distress.     Appearance: Normal appearance. She is normal weight. She is not ill-appearing, toxic-appearing or diaphoretic.   HENT:      Head: Normocephalic.      Right Ear: Tympanic membrane, ear canal and external ear normal. There is no impacted cerumen.      Left Ear: Tympanic membrane, " ear canal and external ear normal. There is no impacted cerumen.      Nose: No congestion or rhinorrhea.      Mouth/Throat:      Mouth: Mucous membranes are moist.      Pharynx: Oropharynx is clear. No oropharyngeal exudate or posterior oropharyngeal erythema.   Eyes:      General: No scleral icterus.        Right eye: No discharge.         Left eye: No discharge.      Extraocular Movements: Extraocular movements intact.      Conjunctiva/sclera: Conjunctivae normal.      Pupils: Pupils are equal, round, and reactive to light.   Neck:      Vascular: No carotid bruit.   Cardiovascular:      Rate and Rhythm: Normal rate and regular rhythm.      Pulses: Normal pulses.      Heart sounds: Normal heart sounds. No murmur heard.     No friction rub. No gallop.   Pulmonary:      Effort: Pulmonary effort is normal. No respiratory distress.      Breath sounds: No stridor. Wheezing present. No rhonchi or rales.   Chest:      Chest wall: No tenderness.   Musculoskeletal:      Cervical back: Normal range of motion and neck supple. No rigidity or tenderness.   Lymphadenopathy:      Cervical: No cervical adenopathy.   Neurological:      Mental Status: She is alert.

## 2025-01-24 ENCOUNTER — TELEPHONE (OUTPATIENT)
Dept: ADMINISTRATIVE | Facility: OTHER | Age: 35
End: 2025-01-24

## 2025-01-24 NOTE — TELEPHONE ENCOUNTER
Upon review of the In Basket request we were able to locate, review, and update the patient chart as requested for Pap Smear (HPV) aka Cervical Cancer Screening.    Any additional questions or concerns should be emailed to the Practice Liaisons via the appropriate education email address, please do not reply via In Basket.    Thank you  YUE ALEJO MA   [facility]

## 2025-01-24 NOTE — TELEPHONE ENCOUNTER
----- Message from Antonette LEVINE sent at 1/23/2025  1:53 PM EST -----  Regarding: Care Gap request  01/23/25 1:54 PM    Hello, our patient attached above has had Pap Smear (HPV) aka Cervical Cancer Screening completed/performed. Please assist in updating the patient chart by pulling the Care Everywhere (CE) document. The date of service is 2/22/2024.     Thank you,  Antonette Rutledge  [facility]

## 2025-03-31 ENCOUNTER — OFFICE VISIT (OUTPATIENT)
Dept: FAMILY MEDICINE CLINIC | Facility: CLINIC | Age: 35
End: 2025-03-31
Payer: COMMERCIAL

## 2025-03-31 VITALS
BODY MASS INDEX: 24.14 KG/M2 | WEIGHT: 150.2 LBS | OXYGEN SATURATION: 98 % | HEIGHT: 66 IN | SYSTOLIC BLOOD PRESSURE: 116 MMHG | DIASTOLIC BLOOD PRESSURE: 64 MMHG | HEART RATE: 88 BPM

## 2025-03-31 DIAGNOSIS — E66.3 OVERWEIGHT WITH BODY MASS INDEX (BMI) OF 26 TO 26.9 IN ADULT: ICD-10-CM

## 2025-03-31 DIAGNOSIS — F51.01 PRIMARY INSOMNIA: Primary | ICD-10-CM

## 2025-03-31 PROCEDURE — 99214 OFFICE O/P EST MOD 30 MIN: CPT | Performed by: INTERNAL MEDICINE

## 2025-03-31 PROCEDURE — 99395 PREV VISIT EST AGE 18-39: CPT | Performed by: INTERNAL MEDICINE

## 2025-03-31 RX ORDER — PHENTERMINE HYDROCHLORIDE 37.5 MG/1
37.5 CAPSULE ORAL EVERY MORNING
Qty: 30 CAPSULE | Refills: 0 | Status: SHIPPED | OUTPATIENT
Start: 2025-03-31

## 2025-03-31 NOTE — ASSESSMENT & PLAN NOTE
She reports difficulty staying asleep, often waking up within an hour or two of going to bed. The importance of sleep for overall health and its potential link to weight management and dementia was discussed. Recommendations include maintaining consistent sleep and wake times, avoiding screens at night, and engaging in relaxing activities if unable to fall back asleep. She is advised to avoid exercising in the evening. A link to the [employer] module for insomnia will be sent to her phone. She is also encouraged to download the CBT-i  km for additional support.

## 2025-03-31 NOTE — PROGRESS NOTES
Name: Maribel Caldwell      : 1990      MRN: [id number]  Encounter Provider: Mahamed Dunn MD  Encounter Date: 3/31/2025   Encounter department: [facility]    Assessment & Plan  Overweight with body mass index (BMI) of 26 to 26.9 in adult  Her weight has increased by approximately 10 pounds since discontinuing phentermine 37.5 mg last April. She reports increased appetite and difficulty maintaining portion control. She is advised to resume phentermine 37.5 mg daily and adjust the frequency based on her weight loss progress. A prescription for phentermine 37.5 mg will be sent to [facility]  in [place].   Orders:    phentermine 37.5 MG capsule; Take 1 capsule (37.5 mg total) by mouth every morning    Primary insomnia  She reports difficulty staying asleep, often waking up within an hour or two of going to bed. The importance of sleep for overall health and its potential link to weight management and dementia was discussed. Recommendations include maintaining consistent sleep and wake times, avoiding screens at night, and engaging in relaxing activities if unable to fall back asleep. She is advised to avoid exercising in the evening. A link to the [employer] module for insomnia will be sent to her phone. She is also encouraged to download the CBT-i  km for additional support.       Health maintenance.  She had a Pap smear last week at her gynecologist's office. She is advised to schedule an eye exam as she is overdue for her annual visit. A follow-up appointment with her dermatologist is already scheduled for  to monitor moles and a scar on her back.    Answers submitted by the patient for this visit:  Annual Physical (Submitted on 3/28/2025)  Diet/Nutrition choices: no special diet  Exercise choices: walking, moderate cardiovascular exercise, 3-4 times a week on average, 30-60 minutes on average  Sleep choices: 4-6 hours of sleep on average  Hearing choices: normal hearing  bilateral ears  Vision choices: most recent eye exam > 1 year ago, wears glasses and contacts  Dental choices: regular dental visits, brushes teeth twice daily  Do you currently have an OB/GYN provider that you routinely follow with?: Yes  Menopausal status: premenopausal  Last menstrual cycle (if applicable):: 3/1/2025  Any history of sexual transmitted disease/infection?: No  Contraception: barrier methods  Do you have an advance directive/living will?: No  Do you have a durable power of  (POA)?: Yes       History of Present Illness     History of Present Illness  The patient is a 35-year-old female who presents for a physical exam.    She has been experiencing weight gain, with an increase of approximately 10 pounds since October 2024. Her weight was 140 pounds in October 2024. She attributes this to a change in appetite following the discontinuation of phentermine 37.5 mg, which she had been using effectively for weight management. She reports increased hunger, particularly at night, and occasional snacking. She is considering resuming phentermine on an alternate-day basis. She maintains a regular exercise routine, typically in the early morning or late evening. She was on phentermine 37.5 mg.    She reports poor sleep quality, often waking up within 1 to 2 hours of falling asleep and struggling to return to sleep. She admits to using her phone during these wakeful periods.    She had a Pap smear last week. She wears contacts and sees an eye doctor once a year. She also sees a dermatologist once a year and had a couple of moles removed about 10 months ago. She has a follow-up appointment scheduled for June 2025.    Supplemental Information  She has been experiencing twitching in her right eye for the past day or two.    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      Past Medical History:   Diagnosis Date    Abnormal Pap smear of cervix     Basal cell carcinoma, face     Carrier of fragile X  "chromosome     Hypothyroid in pregnancy, antepartum      Past Surgical History:   Procedure Laterality Date    KNEE ARTHROSCOPY W/ MENISCAL REPAIR      KNEE SURGERY      WISDOM TOOTH EXTRACTION       Family History   Problem Relation Age of Onset    Diabetes Mother     Diabetic kidney disease Mother     Parkinsonism Father     Thyroid disease Maternal Grandmother     Diabetes Maternal Grandmother     Cancer Maternal Grandfather         Stomach cancer    Cancer Paternal Grandfather         Esophageal cancer    Thyroid disease Maternal Aunt      Social History     Tobacco Use    Smoking status: Never     Passive exposure: Never    Smokeless tobacco: Never   Vaping Use    Vaping status: Never Used   Substance and Sexual Activity    Alcohol use: Yes     Comment: Occasionally    Drug use: Never    Sexual activity: Yes     Partners: Male     Birth control/protection: None     Current Outpatient Medications on File Prior to Visit   Medication Sig    [DISCONTINUED] fluticasone (FLONASE) 50 mcg/act nasal spray 2 sprays into each nostril daily (Patient not taking: Reported on 12/5/2024)    [DISCONTINUED] methylPREDNISolone 4 MG tablet therapy pack Use as directed on package    [DISCONTINUED] phentermine 37.5 MG capsule Take 1 capsule (37.5 mg total) by mouth every morning (Patient not taking: Reported on 3/31/2025)     No Known Allergies  Immunization History   Administered Date(s) Administered    COVID-19 MODERNA VACC 0.5 ML IM 04/13/2021, 05/11/2021    COVID-19, unspecified 04/13/2021, 05/11/2021    INFLUENZA 10/03/2016, 10/02/2019    Meningococcal MCV4, Unspecified 08/12/2006    Tdap 08/12/2006, 02/20/2017, 09/18/2019    Unknown 04/13/2021, 05/11/2021     Objective   /64 (BP Location: Left arm, Patient Position: Sitting, Cuff Size: Standard)   Pulse 88   Ht 5' 6\" (1.676 m)   Wt 68.1 kg (150 lb 3.2 oz)   LMP 03/01/2025   SpO2 98%   BMI 24.24 kg/m²     Wt Readings from Last 3 Encounters:   03/31/25 68.1 kg (150 " lb 3.2 oz)   12/05/24 63.5 kg (140 lb)   08/13/24 65.3 kg (144 lb)      Physical Exam    Physical Exam  Vitals reviewed.   Constitutional:       General: She is not in acute distress.     Appearance: Normal appearance. She is well-developed. She is not ill-appearing.   HENT:      Head: Normocephalic and atraumatic.      Right Ear: Tympanic membrane and external ear normal.      Left Ear: Tympanic membrane and external ear normal.      Nose: Nose normal.      Mouth/Throat:      Mouth: Mucous membranes are moist.      Pharynx: Oropharynx is clear.   Eyes:      General: No scleral icterus.     Extraocular Movements: Extraocular movements intact.      Pupils: Pupils are equal, round, and reactive to light.   Neck:      Thyroid: No thyroid mass or thyromegaly.      Vascular: No JVD.   Cardiovascular:      Rate and Rhythm: Normal rate and regular rhythm.      Heart sounds: Normal heart sounds. No murmur heard.     No friction rub. No gallop.   Pulmonary:      Breath sounds: Normal breath sounds.   Abdominal:      General: Bowel sounds are normal. There is no distension.      Palpations: Abdomen is soft. There is no mass.   Musculoskeletal:         General: No swelling.   Skin:     Comments: 8 mm atypical nevus on right thoracic back (see image)   Neurological:      Mental Status: She is alert.   Psychiatric:         Mood and Affect: Mood normal.

## 2025-05-15 DIAGNOSIS — E66.3 OVERWEIGHT WITH BODY MASS INDEX (BMI) OF 26 TO 26.9 IN ADULT: ICD-10-CM

## 2025-05-15 RX ORDER — PHENTERMINE HYDROCHLORIDE 37.5 MG/1
37.5 CAPSULE ORAL EVERY MORNING
Qty: 30 CAPSULE | Refills: 0 | Status: SHIPPED | OUTPATIENT
Start: 2025-05-15

## 2025-06-02 ENCOUNTER — TELEPHONE (OUTPATIENT)
Age: 35
End: 2025-06-02

## 2025-06-02 ENCOUNTER — OFFICE VISIT (OUTPATIENT)
Dept: FAMILY MEDICINE CLINIC | Facility: CLINIC | Age: 35
End: 2025-06-02
Payer: COMMERCIAL

## 2025-06-02 ENCOUNTER — PATIENT MESSAGE (OUTPATIENT)
Dept: FAMILY MEDICINE CLINIC | Facility: CLINIC | Age: 35
End: 2025-06-02

## 2025-06-02 VITALS
SYSTOLIC BLOOD PRESSURE: 114 MMHG | OXYGEN SATURATION: 99 % | HEART RATE: 92 BPM | DIASTOLIC BLOOD PRESSURE: 72 MMHG | HEIGHT: 66 IN | BODY MASS INDEX: 24.01 KG/M2 | WEIGHT: 149.4 LBS

## 2025-06-02 DIAGNOSIS — H53.2 DOUBLE VISION: Primary | ICD-10-CM

## 2025-06-02 PROCEDURE — 99214 OFFICE O/P EST MOD 30 MIN: CPT | Performed by: FAMILY MEDICINE

## 2025-06-02 NOTE — PROGRESS NOTES
"Name: Maribel Caldwell      : 1990      MRN: [id number]  Encounter Provider: Robert Budinetz, MD  Encounter Date: 2025   Encounter department: [facility]  :  Assessment & Plan  Double vision  Her grandmother had similar sx in her 30s and has MS  Check MRI  Ref neuro  Check labs  Orders:    MRI brain w wo contrast; Future    Ambulatory Referral to Neurology; Future    CBC and differential; Future    Comprehensive metabolic panel; Future    TSH, 3rd generation with Free T4 reflex; Future           History of Present Illness   The patient has had about 5 episodes that last up to a minute where she has double vision blurry vision crossed vision she describes it and seeing black spots.  No other symptoms no palpitations chest pain.  No slurred speech or weakness in her extremities.  Of note her grandmother does have MS and presented at the same age with similar symptoms.  She is not on any medication at this time she stopped the phentermine.  She is not having any issues with balance or falling.      Review of Systems   Respiratory: Negative.     Cardiovascular: Negative.    Gastrointestinal: Negative.        Objective   /72 (BP Location: Left arm, Patient Position: Sitting, Cuff Size: Standard)   Pulse 92   Ht 5' 6\" (1.676 m)   Wt 67.8 kg (149 lb 6.4 oz)   SpO2 99%   BMI 24.11 kg/m²      Physical Exam  Vitals and nursing note reviewed.   Constitutional:       General: She is not in acute distress.     Appearance: She is well-developed.   HENT:      Head: Normocephalic and atraumatic.     Eyes:      Conjunctiva/sclera: Conjunctivae normal.       Cardiovascular:      Rate and Rhythm: Normal rate and regular rhythm.      Heart sounds: No murmur heard.  Pulmonary:      Effort: Pulmonary effort is normal. No respiratory distress.      Breath sounds: Normal breath sounds.   Abdominal:      Palpations: Abdomen is soft.      Tenderness: There is no abdominal tenderness. "     Musculoskeletal:         General: No swelling.      Cervical back: Neck supple.     Skin:     General: Skin is warm and dry.      Capillary Refill: Capillary refill takes less than 2 seconds.     Neurological:      Mental Status: She is alert.     Psychiatric:         Mood and Affect: Mood normal.

## 2025-06-03 ENCOUNTER — APPOINTMENT (OUTPATIENT)
Dept: LAB | Facility: CLINIC | Age: 35
End: 2025-06-03
Payer: COMMERCIAL

## 2025-06-03 DIAGNOSIS — H53.2 DOUBLE VISION: ICD-10-CM

## 2025-06-03 LAB
ALBUMIN SERPL BCG-MCNC: 4.9 G/DL (ref 3.5–5)
ALP SERPL-CCNC: 67 U/L (ref 34–104)
ALT SERPL W P-5'-P-CCNC: 14 U/L (ref 7–52)
ANION GAP SERPL CALCULATED.3IONS-SCNC: 10 MMOL/L (ref 4–13)
AST SERPL W P-5'-P-CCNC: 17 U/L (ref 13–39)
BASOPHILS # BLD AUTO: 0.09 THOUSANDS/ÂΜL (ref 0–0.1)
BASOPHILS NFR BLD AUTO: 1 % (ref 0–1)
BILIRUB SERPL-MCNC: 0.58 MG/DL (ref 0.2–1)
BUN SERPL-MCNC: 11 MG/DL (ref 5–25)
CALCIUM SERPL-MCNC: 9.4 MG/DL (ref 8.4–10.2)
CHLORIDE SERPL-SCNC: 101 MMOL/L (ref 96–108)
CO2 SERPL-SCNC: 25 MMOL/L (ref 21–32)
CREAT SERPL-MCNC: 0.64 MG/DL (ref 0.6–1.3)
EOSINOPHIL # BLD AUTO: 0.13 THOUSAND/ÂΜL (ref 0–0.61)
EOSINOPHIL NFR BLD AUTO: 2 % (ref 0–6)
ERYTHROCYTE [DISTWIDTH] IN BLOOD BY AUTOMATED COUNT: 12 % (ref 11.6–15.1)
GFR SERPL CREATININE-BSD FRML MDRD: 115 ML/MIN/1.73SQ M
GLUCOSE P FAST SERPL-MCNC: 78 MG/DL (ref 65–99)
HCT VFR BLD AUTO: 44.8 % (ref 34.8–46.1)
HGB BLD-MCNC: 14.9 G/DL (ref 11.5–15.4)
IMM GRANULOCYTES # BLD AUTO: 0.01 THOUSAND/UL (ref 0–0.2)
IMM GRANULOCYTES NFR BLD AUTO: 0 % (ref 0–2)
LYMPHOCYTES # BLD AUTO: 2.14 THOUSANDS/ÂΜL (ref 0.6–4.47)
LYMPHOCYTES NFR BLD AUTO: 32 % (ref 14–44)
MCH RBC QN AUTO: 31 PG (ref 26.8–34.3)
MCHC RBC AUTO-ENTMCNC: 33.3 G/DL (ref 31.4–37.4)
MCV RBC AUTO: 93 FL (ref 82–98)
MONOCYTES # BLD AUTO: 0.5 THOUSAND/ÂΜL (ref 0.17–1.22)
MONOCYTES NFR BLD AUTO: 7 % (ref 4–12)
NEUTROPHILS # BLD AUTO: 3.91 THOUSANDS/ÂΜL (ref 1.85–7.62)
NEUTS SEG NFR BLD AUTO: 58 % (ref 43–75)
NRBC BLD AUTO-RTO: 0 /100 WBCS
PLATELET # BLD AUTO: 278 THOUSANDS/UL (ref 149–390)
PMV BLD AUTO: 11 FL (ref 8.9–12.7)
POTASSIUM SERPL-SCNC: 4 MMOL/L (ref 3.5–5.3)
PROT SERPL-MCNC: 8 G/DL (ref 6.4–8.4)
RBC # BLD AUTO: 4.8 MILLION/UL (ref 3.81–5.12)
SODIUM SERPL-SCNC: 136 MMOL/L (ref 135–147)
TSH SERPL DL<=0.05 MIU/L-ACNC: 1.94 UIU/ML (ref 0.45–4.5)
WBC # BLD AUTO: 6.78 THOUSAND/UL (ref 4.31–10.16)

## 2025-06-03 PROCEDURE — 84443 ASSAY THYROID STIM HORMONE: CPT

## 2025-06-03 PROCEDURE — 85025 COMPLETE CBC W/AUTO DIFF WBC: CPT

## 2025-06-03 PROCEDURE — 36415 COLL VENOUS BLD VENIPUNCTURE: CPT

## 2025-06-03 PROCEDURE — 80053 COMPREHEN METABOLIC PANEL: CPT

## 2025-06-04 ENCOUNTER — RESULTS FOLLOW-UP (OUTPATIENT)
Dept: FAMILY MEDICINE CLINIC | Facility: CLINIC | Age: 35
End: 2025-06-04